# Patient Record
Sex: MALE | Race: WHITE | Employment: FULL TIME | URBAN - METROPOLITAN AREA
[De-identification: names, ages, dates, MRNs, and addresses within clinical notes are randomized per-mention and may not be internally consistent; named-entity substitution may affect disease eponyms.]

---

## 2018-01-29 ENCOUNTER — OFFICE VISIT (OUTPATIENT)
Dept: URGENT CARE | Facility: URGENT CARE | Age: 33
End: 2018-01-29
Payer: COMMERCIAL

## 2018-01-29 ENCOUNTER — RADIANT APPOINTMENT (OUTPATIENT)
Dept: GENERAL RADIOLOGY | Facility: CLINIC | Age: 33
End: 2018-01-29
Attending: NURSE PRACTITIONER
Payer: COMMERCIAL

## 2018-01-29 VITALS
WEIGHT: 148 LBS | HEIGHT: 67 IN | DIASTOLIC BLOOD PRESSURE: 94 MMHG | BODY MASS INDEX: 23.23 KG/M2 | SYSTOLIC BLOOD PRESSURE: 160 MMHG | TEMPERATURE: 98.4 F | HEART RATE: 80 BPM

## 2018-01-29 DIAGNOSIS — S90.222A: ICD-10-CM

## 2018-01-29 DIAGNOSIS — S96.912A: Primary | ICD-10-CM

## 2018-01-29 DIAGNOSIS — S99.922A INJURY OF LEFT FOOT, INITIAL ENCOUNTER: ICD-10-CM

## 2018-01-29 PROCEDURE — 99213 OFFICE O/P EST LOW 20 MIN: CPT | Performed by: NURSE PRACTITIONER

## 2018-01-29 PROCEDURE — 73660 X-RAY EXAM OF TOE(S): CPT | Mod: LT

## 2018-01-29 RX ORDER — HYDROCODONE BITARTRATE AND ACETAMINOPHEN 5; 325 MG/1; MG/1
1 TABLET ORAL EVERY 6 HOURS PRN
Qty: 20 TABLET | Refills: 0 | Status: SHIPPED | OUTPATIENT
Start: 2018-01-29 | End: 2020-07-07

## 2018-01-29 NOTE — MR AVS SNAPSHOT
After Visit Summary   1/29/2018    Parish Rodríguez    MRN: [id number]           Patient Information     Date Of Birth          1985        Visit Information        Provider Department      1/29/2018 5:20 PM Jenna Mccoy APRN CNP [facility]        Today's Diagnoses     Injury of left foot, initial encounter    -  1    Strain of toe of left foot, initial encounter        Subungual contusion of toenail of left foot, initial encounter          Care Instructions    Your provider has referred you to: [facility]: [facility] [phone]       Soft Tissue Contusion  You have a contusion. This is also called a bruise. There is swelling and some bleeding under the skin. This injury generally takes a few days to a few weeks to heal.  During that time, the bruise will typically change in color from reddish, to purple-blue, to greenish-yellow, then to yellow-brown.  Home care    Elevate the injured area to reduce pain and swelling. As much as possible, sit or lie down with the injured area raised about the level of your heart. This is especially important during the first 48 hours.    Ice the injured area to help reduce pain and swelling. Wrap a cold source (ice pack or ice cubes in a plastic bag) in a thin towel. Apply to the bruised area for 20 minutes every 1 to 2 hours the first day. Continue this 3 to 4 times a day until the pain and swelling goes away.    Unless another medicine was prescribed, you can take acetaminophen, ibuprofen, or naproxen to control pain. (If you have chronic liver or kidney disease or ever had a stomach ulcer or gastrointestinal bleeding, talk with your doctor before using these medicines.)  Follow-up care  Follow up with your healthcare provider or our staff as advised. Call if you are not better in 1 to 2 weeks.  When to seek medical advice   Call your healthcare provider right away if you have any of the  following:    Increased pain or swelling    Bruise is on an arm or leg and arm or leg becomes cold, blue, numb or tingly    Signs of infection: Warmth, drainage, or increased redness or pain around the contusion    Inability to move the injured area or body part     Bruise is near your eye and you have problems with your eyesight or eye     Frequent bruising for unknown reasons  Date Last Reviewed: 5/1/2017 2000-2017 [employer]. [address] [place] [address]. All rights reserved. This information is not intended as a substitute for professional medical care. Always follow your healthcare professional's instructions.        Subungual Hematoma  A subungual hematoma is blood under the nail. It can occur when your finger or toe is hit or crushed. It causes the nail to look blue. In some cases, you may fracture the bone under the nail.   If the bruise is small and not too painful, it will heal without treatment. If the bruise is large and painful, you may need to have the blood drained.  If a large area of the nail is damaged, your doctor may want to remove it. If he or she does not remove the nail, it may become loose or fall off in the next 2 weeks. In almost all cases, the nail will grow back from the area under the cuticle called the matrix. This takes a few weeks to start and is complete in about 4 to 6 months for a fingernail and 12 months for a toenail. If the nail bed or matrix was damaged, the nail may grow back with a rough or irregular shape. Sometimes the nail may not regrow at all.  Home care  The following guidelines will help you care for your wound at home:    Apply an ice pack (ice cubes in a plastic bag, wrapped in a thin towel) for no more than 20 minutes every 3 to 6 hours for the first 1 to 2 days. Continue this, as needed, 3 to 4 times a day until the pain and swelling goes away.    If the nail was drained:    Keep the nail covered with a clean adhesive bandage for the  next 2 days. There may be some oozing of blood during that time, so change the bandage as needed.    Rinse the finger or toe once a day under warm running water. Clean any crust away with a cotton-tipped applicator soaked in soapy water.    If the nail was removed:    The nail bed (tissue under the nail) is moist, soft and sensitive. This needs to be protected from injury for the first 7 to 10 days until it dries out and becomes hard. Keep it covered with a dressing or adhesive bandage until that time.    Bandages tend to stick to a newly exposed nail bed and can be hard to remove if left in place more than 24 hours. Therefore, unless you were told otherwise, change dressings every 24 hours. Apply petroleum jelly and then a non-stick dressing. This will keep the bandage from sticking and make it easier to remove.  If necessary, soak the dressing off while holding your finger or toe under warm running water.    If an X-ray showed a fracture, protect the finger or toe for 3 to 4 weeks while it is healing.  Here is some information regarding medicine and your wound:    You can take over-the-counter pain medicine such as acetaminophen for pain, unless you were given a different pain medicine to use. Talk with your healthcare provider before using this medicine if you have chronic liver or kidney disease. Also talk with your provider if you have had a stomach ulcer or digestive tract bleeding, or you are taking blood-thinner medicine.    If you were given antibiotics, take them until they are used up. It is important to finish the antibiotics even if the wound looks better. This will ensure the infection has cleared.  Follow-up care  Follow up with your doctor, or as advised. If the nail was drained, there is a small risk of infection. Watch carefully for the signs listed below.  When to seek medical advice  Call your doctor right away if any of these occur:    Increasing redness around the nail    Increasing local pain  or swelling    Pus draining from the nail    Fever of 100.4 F (38 C) or higher, or as directed by your healthcare provider  Date Last Reviewed: 9/1/2016 2000-2017 [employer]. [address] [place] [address], [place] [address]. All rights reserved. This information is not intended as a substitute for professional medical care. Always follow your healthcare professional's instructions.        Toe Sprain  A sprain is a stretching or tearing of the ligaments that hold a joint together. There are no broken bones. Sprains generally take from 3-6 weeks to heal. A toe sprain may be treated by taping the injured toe to the next toe. This is called buddy taping. This protects the injured toe and holds it in position. Mild sprains may not need any additional support. If the toenail has been hurt badly, it may fall off in 1-2 weeks. A new one will usually start to grow back within a month.     Home care    Keep your leg elevated when sitting or lying down. This is very important during the first 48 hours to reduce swelling. Stay off the injured foot as much as possible until you can walk on it without pain. If needed, you may use crutches during the first week for this purpose. Crutches can be rented at many pharmacies or surgical/orthopedic supply stores.    You may be given a cast shoe to wear to prevent movement in your toe. If not, you can use a sandal or any shoe that does not put pressure on the injured toe until the swelling and pain go away. If using a sandal, be careful not to hit your foot against anything, since another injury could make the sprain worse.    Apply an ice pack over the injured area for 15 to 20 minutes every 3 to 6 hours. You should do this for the first 24 to 48 hours. You can make an ice pack by filling a plastic bag that seals at the top with ice cubes and then wrapping it with a thin towel. Continue to use ice packs for relief of pain and swelling as needed. As the ice melts, be careful  to avoid getting your wrap, splint, or cast wet. As the ice melts, be careful to avoid getting any wrap, splint, tape, or cast wet. After 48 hours, apply heat from a warm shower or bath for 20 minutes several times daily. Alternating ice and heat may also be helpful.    If haseeb tape was applied and it becomes wet or dirty, change it. You may replace it with paper, plastic or cloth tape. Cloth tape and paper tapes must be kept dry. Apply gauze or cotton padding between the toes, especially near webbed area. This will help prevent the skin from getting moist and breaking down. Keep the haseeb tape in place for at least 4 weeks, or as advised by your healthcare provider.    You may use over-the-counter pain medicine to control pain, unless another pain medicine was prescribed. If you have chronic liver or kidney disease or ever had a stomach ulcer or GI bleeding, talk with your healthcare provider before using these medicines.    You may return to sports after healing, when you can run without pain.  Follow-up care  Follow up with your with your healthcare provider as advised. Sometimes fractures don t show up on the first X-ray. Bruises and sprains can sometimes hurt as much as a fracture. These injuries can take time to heal completely. If your symptoms don t improve or they get worse, talk with your healthcare provider. You may need a repeat X-ray. If X-rays were taken, you will be told of any new findings that may affect your care.  When to seek medical advice  Call your healthcare provider right away if any of these occur:    Redness, warmth, or fluid drainage from your toe    Pain or swelling increases    Toes become cold, blue, numb, or tingly  Date Last Reviewed: 11/20/2015 2000-2017 [employer]. [address] [place] [address], [place] [address]. All rights reserved. This information is not intended as a substitute for professional medical care. Always follow your healthcare professional's  "instructions.                Follow-ups after your visit        Who to contact     If you have questions or need follow up information about today's clinic visit or your schedule please contact [facility] directly at [phone].  Normal or non-critical lab and imaging results will be communicated to you by MyChart, letter or phone within 4 business days after the clinic has received the results. If you do not hear from us within 7 days, please contact the clinic through [employer] or phone. If you have a critical or abnormal lab result, we will notify you by phone as soon as possible.  Submit refill requests through txt[employer] or call your pharmacy and they will forward the refill request to us. Please allow 3 business days for your refill to be completed.          Additional Information About Your Visit        [employer] Information     txtr lets you send messages to your doctor, view your test results, renew your prescriptions, schedule appointments and more. To sign up, go to [email or web address] . Click on \"Log in\" on the left side of the screen, which will take you to the Welcome page. Then click on \"Sign up Now\" on the right side of the page.     You will be asked to enter the access code listed below, as well as some personal information. Please follow the directions to create your username and password.     Your access code is: [email or web address]  Expires: 2018  6:06 PM     Your access code will  in 90 days. If you need help or a new code, please call your [facility] clinic or [phone].        Care EveryWhere ID     This is your Care EveryWhere ID. This could be used by other organizations to access your [facility] medical records  [id number]        Your Vitals Were     Pulse Temperature Height BMI (Body Mass Index)          80 98.4  F (36.9  C) (Tympanic) 5' 7\" (1.702 m) 23.18 kg/m2         Blood Pressure from Last 3 Encounters:   18 (!) 160/94   16 124/81 "   01/29/08 118/70    Weight from Last 3 Encounters:   01/29/18 148 lb (67.1 kg)   01/28/16 129 lb (58.5 kg)   01/29/08 133 lb 12.8 oz (60.7 kg)                 Today's Medication Changes          These changes are accurate as of 1/29/18  6:06 PM.  If you have any questions, ask your nurse or doctor.               Start taking these medicines.        Dose/Directions    HYDROcodone-acetaminophen 5-325 MG per tablet   Commonly known as:  NORCO   Used for:  Strain of toe of left foot, initial encounter   Started by:  Jenna Mccoy APRN CNP        Dose:  1 tablet   Take 1 tablet by mouth every 6 hours as needed for pain maximum 4 tablet(s) per day   Quantity:  20 tablet   Refills:  0            Where to get your medicines      Some of these will need a paper prescription and others can be bought over the counter.  Ask your nurse if you have questions.     Bring a paper prescription for each of these medications     HYDROcodone-acetaminophen 5-325 MG per tablet                Primary Care Provider Office Phone # Fax #    Bimal Roldan [phone]       [address] [place] [phone]        Equal Access to Services     [facility] AH: Hadii tiffanie simeon hadasho Sojudith, waaxda luqadaha, qaybta kaalmada adeegyada, johny grimm . So [facility] [phone].    ATENCIÓN: Si habla español, tiene a parham disposición servicios gratuitos de asistencia lingüística. Llame al [phone].    We comply with applicable federal civil rights laws and [place] laws. We do not discriminate on the basis of race, color, national origin, age, disability, sex, sexual orientation, or gender identity.            Thank you!     Thank you for choosing [facility]  for your care. Our goal is always to provide you with excellent care. Hearing back from our patients is one way we can continue to improve our services. Please take a few minutes to complete the written  survey that you may receive in the mail after your visit with us. Thank you!             Your Updated Medication List - Protect others around you: Learn how to safely use, store and throw away your medicines at www.disposemymeds.org.          This list is accurate as of 1/29/18  6:06 PM.  Always use your most recent med list.                   Brand Name Dispense Instructions for use Diagnosis    HYDROcodone-acetaminophen 5-325 MG per tablet    NORCO    20 tablet    Take 1 tablet by mouth every 6 hours as needed for pain maximum 4 tablet(s) per day    Strain of toe of left foot, initial encounter

## 2018-01-29 NOTE — PROGRESS NOTES
"SUBJECTIVE:  Parish Rodríguez is a 32 year old male who sustained a left foot injury 3 days ago.   Mechanism of injury: dropped plate on his toe.   Immediate symptoms: immediate swelling.   Symptoms have been gradual since that time.   Prior history of related problems: no prior problems with this area in the past.    Past Medical History:   Diagnosis Date     Calculus of kidney 12/04     Headache(784.0)     occas with stress      Past Surgical History:   Procedure Laterality Date     SURGICAL HISTORY OF -   1995    TONGUE TIED     SURGICAL HISTORY OF -   2/02    Lt hand 5th metacarpal fx - [facility] - Dr Velásquez      Social History   Substance Use Topics     Smoking status: Current Every Day Smoker     Packs/day: 0.50     Years: 5.00     Types: Cigarettes     Last attempt to quit: 8/1/2005     Smokeless tobacco: Never Used     Alcohol use 10.0 oz/week       Constitutional, HEENT, cardiovascular, pulmonary, gi and gu systems are negative, except as otherwise noted.    OBJECTIVE:  Blood pressure (!) 160/94, pulse 80, temperature 98.4  F (36.9  C), temperature source Tympanic, height 5' 7\" (1.702 m), weight 148 lb (67.1 kg).  Appearance: in no apparent distress and well developed and well nourished.  Constitutional: healthy, alert and no distress   Cardiovascular: negative, PMI normal. No lifts, heaves, or thrills. RRR. No murmurs, clicks gallops or rub  Respiratory: negative, Percussion normal. Good diaphragmatic excursion. Lungs clear  NEURO: Gait normal. Reflexes normal and symmetric. Sensation grossly WNL.    Foot/ankle exam: ecchymoses and swelling of the greater toe, peripheral pulses normal, remainder of foot and ankle exam is normal, ipsilateral knee exam is normal, contralateral foot exam is normal.    X-ray:   XR TOE LT G/E 2 VW 1/29/2018 6:02 PM     COMPARISON: None.     HISTORY: Left foot injury.         IMPRESSION: No fractures are seen in the left great toe. Joint spaces  are preserved and in normal " alignment.     ASSESSMENT:    ICD-10-CM    1. Strain of toe of left foot, initial encounter S96.912A HYDROcodone-acetaminophen (NORCO) 5-325 MG per tablet   2. Subungual contusion of toenail of left foot, initial encounter S90.222A PODIATRY/FOOT & ANKLE SURGERY REFERRAL   3. Injury of left foot, initial encounter S99.922A XR Toe Left G/E 2 Views         PLAN:  Patient Instructions   Your provider has referred you to: [facility]: [facility] [phone]       Soft Tissue Contusion  You have a contusion. This is also called a bruise. There is swelling and some bleeding under the skin. This injury generally takes a few days to a few weeks to heal.  During that time, the bruise will typically change in color from reddish, to purple-blue, to greenish-yellow, then to yellow-brown.  Home care    Elevate the injured area to reduce pain and swelling. As much as possible, sit or lie down with the injured area raised about the level of your heart. This is especially important during the first 48 hours.    Ice the injured area to help reduce pain and swelling. Wrap a cold source (ice pack or ice cubes in a plastic bag) in a thin towel. Apply to the bruised area for 20 minutes every 1 to 2 hours the first day. Continue this 3 to 4 times a day until the pain and swelling goes away.    Unless another medicine was prescribed, you can take acetaminophen, ibuprofen, or naproxen to control pain. (If you have chronic liver or kidney disease or ever had a stomach ulcer or gastrointestinal bleeding, talk with your doctor before using these medicines.)  Follow-up care  Follow up with your healthcare provider or our staff as advised. Call if you are not better in 1 to 2 weeks.  When to seek medical advice   Call your healthcare provider right away if you have any of the following:    Increased pain or swelling    Bruise is on an arm or leg and arm or leg becomes cold, blue, numb or tingly    Signs of  infection: Warmth, drainage, or increased redness or pain around the contusion    Inability to move the injured area or body part     Bruise is near your eye and you have problems with your eyesight or eye     Frequent bruising for unknown reasons  Date Last Reviewed: 5/1/2017 2000-2017 [employer]. [address] [place], [place] [id number]. All rights reserved. This information is not intended as a substitute for professional medical care. Always follow your healthcare professional's instructions.        Subungual Hematoma  A subungual hematoma is blood under the nail. It can occur when your finger or toe is hit or crushed. It causes the nail to look blue. In some cases, you may fracture the bone under the nail.   If the bruise is small and not too painful, it will heal without treatment. If the bruise is large and painful, you may need to have the blood drained.  If a large area of the nail is damaged, your doctor may want to remove it. If he or she does not remove the nail, it may become loose or fall off in the next 2 weeks. In almost all cases, the nail will grow back from the area under the cuticle called the matrix. This takes a few weeks to start and is complete in about 4 to 6 months for a fingernail and 12 months for a toenail. If the nail bed or matrix was damaged, the nail may grow back with a rough or irregular shape. Sometimes the nail may not regrow at all.  Home care  The following guidelines will help you care for your wound at home:    Apply an ice pack (ice cubes in a plastic bag, wrapped in a thin towel) for no more than 20 minutes every 3 to 6 hours for the first 1 to 2 days. Continue this, as needed, 3 to 4 times a day until the pain and swelling goes away.    If the nail was drained:    Keep the nail covered with a clean adhesive bandage for the next 2 days. There may be some oozing of blood during that time, so change the bandage as needed.    Rinse the finger or toe once a  day under warm running water. Clean any crust away with a cotton-tipped applicator soaked in soapy water.    If the nail was removed:    The nail bed (tissue under the nail) is moist, soft and sensitive. This needs to be protected from injury for the first 7 to 10 days until it dries out and becomes hard. Keep it covered with a dressing or adhesive bandage until that time.    Bandages tend to stick to a newly exposed nail bed and can be hard to remove if left in place more than 24 hours. Therefore, unless you were told otherwise, change dressings every 24 hours. Apply petroleum jelly and then a non-stick dressing. This will keep the bandage from sticking and make it easier to remove.  If necessary, soak the dressing off while holding your finger or toe under warm running water.    If an X-ray showed a fracture, protect the finger or toe for 3 to 4 weeks while it is healing.  Here is some information regarding medicine and your wound:    You can take over-the-counter pain medicine such as acetaminophen for pain, unless you were given a different pain medicine to use. Talk with your healthcare provider before using this medicine if you have chronic liver or kidney disease. Also talk with your provider if you have had a stomach ulcer or digestive tract bleeding, or you are taking blood-thinner medicine.    If you were given antibiotics, take them until they are used up. It is important to finish the antibiotics even if the wound looks better. This will ensure the infection has cleared.  Follow-up care  Follow up with your doctor, or as advised. If the nail was drained, there is a small risk of infection. Watch carefully for the signs listed below.  When to seek medical advice  Call your doctor right away if any of these occur:    Increasing redness around the nail    Increasing local pain or swelling    Pus draining from the nail    Fever of 100.4 F (38 C) or higher, or as directed by your healthcare provider  Date  Last Reviewed: 9/1/2016 2000-2017 [employer]. [address] [place] [address], [place] [address]. All rights reserved. This information is not intended as a substitute for professional medical care. Always follow your healthcare professional's instructions.        Toe Sprain  A sprain is a stretching or tearing of the ligaments that hold a joint together. There are no broken bones. Sprains generally take from 3-6 weeks to heal. A toe sprain may be treated by taping the injured toe to the next toe. This is called buddy taping. This protects the injured toe and holds it in position. Mild sprains may not need any additional support. If the toenail has been hurt badly, it may fall off in 1-2 weeks. A new one will usually start to grow back within a month.     Home care    Keep your leg elevated when sitting or lying down. This is very important during the first 48 hours to reduce swelling. Stay off the injured foot as much as possible until you can walk on it without pain. If needed, you may use crutches during the first week for this purpose. Crutches can be rented at many pharmacies or surgical/orthopedic supply stores.    You may be given a cast shoe to wear to prevent movement in your toe. If not, you can use a sandal or any shoe that does not put pressure on the injured toe until the swelling and pain go away. If using a sandal, be careful not to hit your foot against anything, since another injury could make the sprain worse.    Apply an ice pack over the injured area for 15 to 20 minutes every 3 to 6 hours. You should do this for the first 24 to 48 hours. You can make an ice pack by filling a plastic bag that seals at the top with ice cubes and then wrapping it with a thin towel. Continue to use ice packs for relief of pain and swelling as needed. As the ice melts, be careful to avoid getting your wrap, splint, or cast wet. As the ice melts, be careful to avoid getting any wrap, splint, tape, or cast  wet. After 48 hours, apply heat from a warm shower or bath for 20 minutes several times daily. Alternating ice and heat may also be helpful.    If haseeb tape was applied and it becomes wet or dirty, change it. You may replace it with paper, plastic or cloth tape. Cloth tape and paper tapes must be kept dry. Apply gauze or cotton padding between the toes, especially near webbed area. This will help prevent the skin from getting moist and breaking down. Keep the haseeb tape in place for at least 4 weeks, or as advised by your healthcare provider.    You may use over-the-counter pain medicine to control pain, unless another pain medicine was prescribed. If you have chronic liver or kidney disease or ever had a stomach ulcer or GI bleeding, talk with your healthcare provider before using these medicines.    You may return to sports after healing, when you can run without pain.  Follow-up care  Follow up with your with your healthcare provider as advised. Sometimes fractures don t show up on the first X-ray. Bruises and sprains can sometimes hurt as much as a fracture. These injuries can take time to heal completely. If your symptoms don t improve or they get worse, talk with your healthcare provider. You may need a repeat X-ray. If X-rays were taken, you will be told of any new findings that may affect your care.  When to seek medical advice  Call your healthcare provider right away if any of these occur:    Redness, warmth, or fluid drainage from your toe    Pain or swelling increases    Toes become cold, blue, numb, or tingly  Date Last Reviewed: 11/20/2015 2000-2017 [employer]. [address] [place] [address], [place] [address]. All rights reserved. This information is not intended as a substitute for professional medical care. Always follow your healthcare professional's instructions.              XAVIER Gonzalez CNP

## 2018-01-30 NOTE — PATIENT INSTRUCTIONS
Your provider has referred you to: [facility]: [facility] [phone]       Soft Tissue Contusion  You have a contusion. This is also called a bruise. There is swelling and some bleeding under the skin. This injury generally takes a few days to a few weeks to heal.  During that time, the bruise will typically change in color from reddish, to purple-blue, to greenish-yellow, then to yellow-brown.  Home care    Elevate the injured area to reduce pain and swelling. As much as possible, sit or lie down with the injured area raised about the level of your heart. This is especially important during the first 48 hours.    Ice the injured area to help reduce pain and swelling. Wrap a cold source (ice pack or ice cubes in a plastic bag) in a thin towel. Apply to the bruised area for 20 minutes every 1 to 2 hours the first day. Continue this 3 to 4 times a day until the pain and swelling goes away.    Unless another medicine was prescribed, you can take acetaminophen, ibuprofen, or naproxen to control pain. (If you have chronic liver or kidney disease or ever had a stomach ulcer or gastrointestinal bleeding, talk with your doctor before using these medicines.)  Follow-up care  Follow up with your healthcare provider or our staff as advised. Call if you are not better in 1 to 2 weeks.  When to seek medical advice   Call your healthcare provider right away if you have any of the following:    Increased pain or swelling    Bruise is on an arm or leg and arm or leg becomes cold, blue, numb or tingly    Signs of infection: Warmth, drainage, or increased redness or pain around the contusion    Inability to move the injured area or body part     Bruise is near your eye and you have problems with your eyesight or eye     Frequent bruising for unknown reasons  Date Last Reviewed: 5/1/2017 2000-2017 [employer]. [address] [place] [address], [place] [address]. All rights reserved. This  information is not intended as a substitute for professional medical care. Always follow your healthcare professional's instructions.        Subungual Hematoma  A subungual hematoma is blood under the nail. It can occur when your finger or toe is hit or crushed. It causes the nail to look blue. In some cases, you may fracture the bone under the nail.   If the bruise is small and not too painful, it will heal without treatment. If the bruise is large and painful, you may need to have the blood drained.  If a large area of the nail is damaged, your doctor may want to remove it. If he or she does not remove the nail, it may become loose or fall off in the next 2 weeks. In almost all cases, the nail will grow back from the area under the cuticle called the matrix. This takes a few weeks to start and is complete in about 4 to 6 months for a fingernail and 12 months for a toenail. If the nail bed or matrix was damaged, the nail may grow back with a rough or irregular shape. Sometimes the nail may not regrow at all.  Home care  The following guidelines will help you care for your wound at home:    Apply an ice pack (ice cubes in a plastic bag, wrapped in a thin towel) for no more than 20 minutes every 3 to 6 hours for the first 1 to 2 days. Continue this, as needed, 3 to 4 times a day until the pain and swelling goes away.    If the nail was drained:    Keep the nail covered with a clean adhesive bandage for the next 2 days. There may be some oozing of blood during that time, so change the bandage as needed.    Rinse the finger or toe once a day under warm running water. Clean any crust away with a cotton-tipped applicator soaked in soapy water.    If the nail was removed:    The nail bed (tissue under the nail) is moist, soft and sensitive. This needs to be protected from injury for the first 7 to 10 days until it dries out and becomes hard. Keep it covered with a dressing or adhesive bandage until that time.    Bandages  tend to stick to a newly exposed nail bed and can be hard to remove if left in place more than 24 hours. Therefore, unless you were told otherwise, change dressings every 24 hours. Apply petroleum jelly and then a non-stick dressing. This will keep the bandage from sticking and make it easier to remove.  If necessary, soak the dressing off while holding your finger or toe under warm running water.    If an X-ray showed a fracture, protect the finger or toe for 3 to 4 weeks while it is healing.  Here is some information regarding medicine and your wound:    You can take over-the-counter pain medicine such as acetaminophen for pain, unless you were given a different pain medicine to use. Talk with your healthcare provider before using this medicine if you have chronic liver or kidney disease. Also talk with your provider if you have had a stomach ulcer or digestive tract bleeding, or you are taking blood-thinner medicine.    If you were given antibiotics, take them until they are used up. It is important to finish the antibiotics even if the wound looks better. This will ensure the infection has cleared.  Follow-up care  Follow up with your doctor, or as advised. If the nail was drained, there is a small risk of infection. Watch carefully for the signs listed below.  When to seek medical advice  Call your doctor right away if any of these occur:    Increasing redness around the nail    Increasing local pain or swelling    Pus draining from the nail    Fever of 100.4 F (38 C) or higher, or as directed by your healthcare provider  Date Last Reviewed: 9/1/2016 2000-2017 [employer]. [address] [place] [address], [place] [id number]. All rights reserved. This information is not intended as a substitute for professional medical care. Always follow your healthcare professional's instructions.        Toe Sprain  A sprain is a stretching or tearing of the ligaments that hold a joint together. There are no broken  bones. Sprains generally take from 3-6 weeks to heal. A toe sprain may be treated by taping the injured toe to the next toe. This is called buddy taping. This protects the injured toe and holds it in position. Mild sprains may not need any additional support. If the toenail has been hurt badly, it may fall off in 1-2 weeks. A new one will usually start to grow back within a month.     Home care    Keep your leg elevated when sitting or lying down. This is very important during the first 48 hours to reduce swelling. Stay off the injured foot as much as possible until you can walk on it without pain. If needed, you may use crutches during the first week for this purpose. Crutches can be rented at many pharmacies or surgical/orthopedic supply stores.    You may be given a cast shoe to wear to prevent movement in your toe. If not, you can use a sandal or any shoe that does not put pressure on the injured toe until the swelling and pain go away. If using a sandal, be careful not to hit your foot against anything, since another injury could make the sprain worse.    Apply an ice pack over the injured area for 15 to 20 minutes every 3 to 6 hours. You should do this for the first 24 to 48 hours. You can make an ice pack by filling a plastic bag that seals at the top with ice cubes and then wrapping it with a thin towel. Continue to use ice packs for relief of pain and swelling as needed. As the ice melts, be careful to avoid getting your wrap, splint, or cast wet. As the ice melts, be careful to avoid getting any wrap, splint, tape, or cast wet. After 48 hours, apply heat from a warm shower or bath for 20 minutes several times daily. Alternating ice and heat may also be helpful.    If buddy tape was applied and it becomes wet or dirty, change it. You may replace it with paper, plastic or cloth tape. Cloth tape and paper tapes must be kept dry. Apply gauze or cotton padding between the toes, especially near webbed area. This  will help prevent the skin from getting moist and breaking down. Keep the haseeb tape in place for at least 4 weeks, or as advised by your healthcare provider.    You may use over-the-counter pain medicine to control pain, unless another pain medicine was prescribed. If you have chronic liver or kidney disease or ever had a stomach ulcer or GI bleeding, talk with your healthcare provider before using these medicines.    You may return to sports after healing, when you can run without pain.  Follow-up care  Follow up with your with your healthcare provider as advised. Sometimes fractures don t show up on the first X-ray. Bruises and sprains can sometimes hurt as much as a fracture. These injuries can take time to heal completely. If your symptoms don t improve or they get worse, talk with your healthcare provider. You may need a repeat X-ray. If X-rays were taken, you will be told of any new findings that may affect your care.  When to seek medical advice  Call your healthcare provider right away if any of these occur:    Redness, warmth, or fluid drainage from your toe    Pain or swelling increases    Toes become cold, blue, numb, or tingly  Date Last Reviewed: 11/20/2015 2000-2017 [employer]. [address] [place] [address], [place] [address]. All rights reserved. This information is not intended as a substitute for professional medical care. Always follow your healthcare professional's instructions.

## 2019-05-09 ENCOUNTER — ALLIED HEALTH/NURSE VISIT (OUTPATIENT)
Dept: FAMILY MEDICINE | Facility: CLINIC | Age: 34
End: 2019-05-09
Payer: COMMERCIAL

## 2019-05-09 VITALS — RESPIRATION RATE: 16 BRPM | DIASTOLIC BLOOD PRESSURE: 82 MMHG | HEART RATE: 64 BPM | SYSTOLIC BLOOD PRESSURE: 134 MMHG

## 2019-05-09 DIAGNOSIS — I10 HTN (HYPERTENSION): Primary | ICD-10-CM

## 2019-05-09 PROCEDURE — 99207 ZZC NO CHARGE NURSE ONLY: CPT

## 2020-04-24 ENCOUNTER — VIRTUAL VISIT (OUTPATIENT)
Dept: FAMILY MEDICINE | Facility: OTHER | Age: 35
End: 2020-04-24

## 2020-04-24 NOTE — PROGRESS NOTES
"Date: 2020 17:42:43  Clinician: Solange Valverde  Clinician NPI: [id number]  Patient: Parish Rodríguez  Patient : 1985  Patient Address: [id number] [place], [place] [address]  Patient Phone: [phone]  Visit Protocol: URI  Patient Summary:  Parish is a 34 year old ( : 1985 ) male who initiated a Visit for COVID-19 (Coronavirus) evaluation and screening. When asked the question \"Please sign me up to receive news, health information and promotions from [employer].\", Parish responded \"No\".    Parish states his symptoms started today.   His symptoms consist of a sore throat and a cough.   Symptom details     Cough: Parish coughs a few times an hour and his cough is not more bothersome at night. Phlegm comes into his throat when he coughs. He does not believe his cough is caused by post-nasal drip. The color of the phlegm is green.     Sore throat: Parish reports having mild throat pain (1-3 on a 10 point pain scale), does not have exudate on his tonsils, and can swallow liquids. The lymph nodes in his neck are not enlarged. A rash has not appeared on the skin since the sore throat started.      Parish denies having facial pain or pressure, nasal congestion, malaise, ear pain, fever, myalgias, rhinitis, headache, wheezing, enlarged lymph nodes, chills, vomiting, nausea, teeth pain, ageusia, anosmia, and diarrhea. He also denies taking antibiotic medication for the symptoms and having recent facial or sinus surgery in the past 60 days. He is not experiencing dyspnea.   Precipitating events  Within the past week, Parish has not been exposed to someone with strep throat. He has not recently been exposed to someone with influenza. Parish has been in close contact with the following high risk individuals: children under the age of 5.   Pertinent COVID-19 (Coronavirus) information  Parish has not traveled internationally or to the areas where COVID-19 (Coronavirus) is widespread, " including cruise ship travel in the last 14 days before the start of his symptoms.   Parish does not work or volunteer as healthcare worker or a  and does not work or volunteer in a healthcare facility.   He does not live with a healthcare worker.   Parish has not had a close contact with a laboratory-confirmed COVID-19 patient within 14 days of symptom onset. He also has not had a close contact with a suspected COVID-19 patient within 14 days of symptom onset.   Pertinent medical history  Parish does not need a return to work/school note.   Weight: 145 lbs   Parish smokes or uses smokeless tobacco.   Additional information as reported by the patient (free text): yesterday I woke up with sinus pressure today I woke up with the feeling of a chest cold and this afternoon I coughed up some green phlegm and the sinus pressure is gone   Weight: 145 lbs    MEDICATIONS: No current medications, ALLERGIES: NKDA  Clinician Response:  Dear Parish,   Dear Parish  Your symptoms show that you may have coronavirus (COVID-19). This illness can cause fever, cough and trouble breathing. Many people get a mild case and get better on their own. Some people can get very sick.   Will I be tested for COVID-19?  Because the virus is spreading, we are no longer testing most patients. You may request testing if:   You are very ill. For example, you're on chemotherapy, dialysis or home hospice care. (Contact your specialty clinic or program.)   You live in a nursing home or other long-term care facility. (Talk to your nurse manager or medical director.)   You're a health care worker. (Contact your employee health office.)   How can I protect others?  Without a test, we can't know for sure that you have COVID-19. For safety, it's very important to follow these rules.  First, stay home and away from others (self-isolate) until:   You've had no fever---and no medicine that reduces fever---for 3 full days (72 hours). And...     Your other symptoms have gotten better. For example, your cough or breathing has improved. And...   At least 7 days have passed since your symptoms started.   During this time:   Don't go to work, school or anywhere else.    Stay away from others in your home. No hugging, kissing or shaking hands.   Don't let anyone visit.   Cover your mouth and nose with a mask, tissue or wash cloth to avoid spreading germs.   Wash your hands and face often. Use soap and water.   How can I take care of myself?   1.Take Tylenol (acetaminophen) for fever or pain. If you have liver or kidney problems, ask your family doctor if it's okay to take Tylenol.        Adults can take either:    650 mg (two 325 mg pills) every 4 to 6 hours, or...   1,000 mg (two 500 mg pills) every 8 hours as needed.    Note: Don't take more than 3,000 mg in one day.   For children, check the Tylenol bottle for the right dose. The dose is based on the child's age or weight.   2.If you have other health problems (like cancer, heart failure, an organ transplant or severe kidney disease): Call your specialty clinic if you don't feel better in the next 2 days.       3.Know when to call 911: If your breathing is so bad that it keeps you from doing normal activities, call 911 or go to the emergency room. Tell them that you've been staying home and may have COVID-19.   Where can I get more information?  To learn more about COVID-19 and how to care for yourself at home, please visit the CDC website at https://www.cdc.gov/coronavirus/2019-ncov/about/steps-when-sick.html.  For more about your care at [facility], please visit [email or web address]     Diagnosis: Cough  Diagnosis ICD: R05

## 2020-06-28 ENCOUNTER — HOSPITAL ENCOUNTER (EMERGENCY)
Facility: CLINIC | Age: 35
Discharge: HOME | End: 2020-06-28
Attending: FAMILY MEDICINE | Admitting: FAMILY MEDICINE
Payer: COMMERCIAL

## 2020-06-28 ENCOUNTER — OFFICE VISIT (OUTPATIENT)
Dept: URGENT CARE | Facility: URGENT CARE | Age: 35
End: 2020-06-28
Payer: COMMERCIAL

## 2020-06-28 VITALS
RESPIRATION RATE: 16 BRPM | SYSTOLIC BLOOD PRESSURE: 151 MMHG | OXYGEN SATURATION: 97 % | TEMPERATURE: 98.1 F | HEIGHT: 67 IN | BODY MASS INDEX: 21.03 KG/M2 | DIASTOLIC BLOOD PRESSURE: 96 MMHG | HEART RATE: 83 BPM | WEIGHT: 134 LBS

## 2020-06-28 VITALS
TEMPERATURE: 97.4 F | RESPIRATION RATE: 16 BRPM | BODY MASS INDEX: 21.06 KG/M2 | SYSTOLIC BLOOD PRESSURE: 150 MMHG | DIASTOLIC BLOOD PRESSURE: 82 MMHG | OXYGEN SATURATION: 98 % | HEIGHT: 67 IN | HEART RATE: 86 BPM | WEIGHT: 134.2 LBS

## 2020-06-28 DIAGNOSIS — T15.92XA EYE FOREIGN BODY, LEFT, INITIAL ENCOUNTER: Primary | ICD-10-CM

## 2020-06-28 DIAGNOSIS — T15.02XA ACUTE FOREIGN BODY OF LEFT CORNEA, INITIAL ENCOUNTER: ICD-10-CM

## 2020-06-28 PROCEDURE — 65222 REMOVE FOREIGN BODY FROM EYE: CPT | Mod: LT | Performed by: FAMILY MEDICINE

## 2020-06-28 PROCEDURE — 99283 EMERGENCY DEPT VISIT LOW MDM: CPT | Mod: 25 | Performed by: FAMILY MEDICINE

## 2020-06-28 PROCEDURE — 99215 OFFICE O/P EST HI 40 MIN: CPT | Performed by: NURSE PRACTITIONER

## 2020-06-28 RX ORDER — TETRACAINE HYDROCHLORIDE 5 MG/ML
1-2 SOLUTION OPHTHALMIC ONCE
Status: DISCONTINUED | OUTPATIENT
Start: 2020-06-28 | End: 2020-06-28 | Stop reason: HOSPADM

## 2020-06-28 RX ORDER — ERYTHROMYCIN 5 MG/G
0.5 OINTMENT OPHTHALMIC 3 TIMES DAILY
Qty: 1 G | Refills: 0 | Status: SHIPPED | OUTPATIENT
Start: 2020-06-28 | End: 2020-07-07

## 2020-06-28 ASSESSMENT — MIFFLIN-ST. JEOR
SCORE: 1497.36
SCORE: 1496.45

## 2020-06-28 ASSESSMENT — PAIN SCALES - GENERAL: PAINLEVEL: MILD PAIN (2)

## 2020-06-28 NOTE — DISCHARGE INSTRUCTIONS
Apply erythromycin ophthalmic ointment to the left eye 3 times daily for up to 3 days until symptoms resolve.  Be seen if not improving daily and resolved within 3 days or if new or worsening symptoms at any time.

## 2020-06-28 NOTE — ED AVS SNAPSHOT
[facility] Emergency Department  [address] [facility] [address]  Phone:  [phone]  Fax:  [phone]                                    Parish Rodríguez   MRN: [id number]    Department:  [facility] Emergency Department   Date of Visit:  6/28/2020           After Visit Summary Signature Page    I have received my discharge instructions, and my questions have been answered. I have discussed any challenges I see with this plan with the nurse or doctor.    ..........................................................................................................................................  Patient/Patient Representative Signature      ..........................................................................................................................................  Patient Representative Print Name and Relationship to Patient    ..................................................               ................................................  Date                                   Time    ..........................................................................................................................................  Reviewed by Signature/Title    ...................................................              ..............................................  Date                                               Time          22EPIC Rev 08/18

## 2020-06-28 NOTE — ED NOTES
Patient comes in with an irritated left eye. States he thinks something is in the eye, unsure of what it could be. Also unaware if it was at work or home. States the pain in very minimal. Denies blurred vision.

## 2020-06-28 NOTE — PROGRESS NOTES
Subjective     Parish Rodríguez is a 34 year old male who presents to clinic today for the following health issues:    HPI     Chief Complaint   Patient presents with     Eye Problem     2-3 days something in his left eye, red, crusty this morning.     He thinks it could have occurred Friday. 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 He was also putting down concrete at home yesterday. Vision normal. He states he can see a small dark speck under pupil.    Patient Active Problem List   Diagnosis     Calculus of kidney     Past Surgical History:   Procedure Laterality Date     SURGICAL HISTORY OF -       TONGUE TIED     SURGICAL HISTORY OF -       Lt hand 5th metacarpal fx - ORIF - Dr Velásquez       Social History     Tobacco Use     Smoking status: Former Smoker     Packs/day: 0.50     Years: 5.00     Pack years: 2.50     Types: Cigarettes     Last attempt to quit: 2005     Years since quittin.9     Smokeless tobacco: Never Used   Substance Use Topics     Alcohol use: Yes     Alcohol/week: 16.7 standard drinks     Family History   Problem Relation Age of Onset     Breast Cancer Maternal Grandmother         ?     Cancer - colorectal Maternal Aunt         ?         Current Outpatient Medications   Medication Sig Dispense Refill     HYDROcodone-acetaminophen (NORCO) 5-325 MG per tablet Take 1 tablet by mouth every 6 hours as needed for pain maximum 4 tablet(s) per day (Patient not taking: Reported on 2020) 20 tablet 0     No Known Allergies      Reviewed and updated as needed this visit by Provider  Tobacco  Allergies  Meds  Problems  Med Hx  Surg Hx  Fam Hx         Review of Systems   Constitutional, HEENT, cardiovascular, pulmonary, GI, , musculoskeletal, neuro, skin, endocrine and psych systems are negative, except as otherwise noted.      Objective    BP (!) 150/82 (BP Location: Right arm, Patient Position: Sitting, Cuff Size: Adult Regular)   " Pulse 86   Temp 97.4  F (36.3  C) (Tympanic)   Resp 16   Ht 1.702 m (5' 7\")   Wt 60.9 kg (134 lb 3.2 oz)   SpO2 98%   BMI 21.02 kg/m    Body mass index is 21.02 kg/m .  Physical Exam   GENERAL: healthy, alert and no distress  EYES: Eyes grossly normal to inspection on right with left being a little injected and I see a very small pin point light bluish speck at about 4 o'clock off edge of pupil in iris, no rust ring, eye anesthetized and stained with no corneal abrasion, rinsed and little speck still present, PERRL and conjunctivae and sclerae normal, vision normal in both eyes  HENT: normocephalic  NECK: no adenopathy, supple with full ROM  ABDOMEN: soft, nontender, no hepatosplenomegaly, no masses and bowel sounds normal  MS: no gross musculoskeletal defects noted, no edema  No rash    Diagnostic Test Results:  Labs reviewed in Epic  No results found for this or any previous visit (from the past 24 hour(s)).        Assessment & Plan  Sending to ER for removal of foreign body in left eye.  Problem List Items Addressed This Visit     None      Visit Diagnoses     Eye foreign body, left, initial encounter    -  Primary                 Patient Instructions   Go directly to the [place] ER. They are expecting you.    No follow-ups on file.    XAVIER Garcia [facility] URG[facility]        "

## 2020-06-28 NOTE — ED PROVIDER NOTES
"  History     Chief Complaint   Patient presents with     Foreign Body in Eye     Sent to ED from [facility] for removal of metal FB in eye     HPI  Parish Rodríguez is a 34 year old male who was sent from the [place] urgent care with foreign body in the left cornea.  He reports foreign body sensation beginning 2 nights ago.  He is unaware of what may have gotten in the eye.  He has no history of contact lens use or eye surgery.  No decrease in his vision.    Allergies:  No Known Allergies    Problem List:    Patient Active Problem List    Diagnosis Date Noted     Calculus of kidney 2004     Priority: Medium        Past Medical History:    Past Medical History:   Diagnosis Date     Calculus of kidney      Headache(784.0)        Past Surgical History:    Past Surgical History:   Procedure Laterality Date     SURGICAL HISTORY OF -       TONGUE TIED     SURGICAL HISTORY OF -       Lt hand 5th metacarpal fx - ORIF - Dr Velásquez       Family History:    Family History   Problem Relation Age of Onset     Breast Cancer Maternal Grandmother         ?     Cancer - colorectal Maternal Aunt         ?       Social History:  Marital Status:  Single [1]  Social History     Tobacco Use     Smoking status: Former Smoker     Packs/day: 0.50     Years: 5.00     Pack years: 2.50     Types: Cigarettes     Last attempt to quit: 2005     Years since quittin.9     Smokeless tobacco: Never Used   Substance Use Topics     Alcohol use: Yes     Alcohol/week: 16.7 standard drinks     Drug use: No     Comment: quit marijuana        Medications:    erythromycin (ROMYCIN) 5 MG/GM ophthalmic ointment  HYDROcodone-acetaminophen (NORCO) 5-325 MG per tablet          Review of Systems  Further problem focused system review negative.    Physical Exam   BP: (!) 151/96  Pulse: 83  Temp: 98.1  F (36.7  C)  Resp: 16  Height: 170.2 cm (5' 7\")  Weight: 60.8 kg (134 lb)  SpO2: 97 %      Physical Exam  Healthy 34-year-old " no distress.  HEENT: There is conjunctival injection on the left eye without ciliary blush.  Lids and lashes are intact.  Left upper lid was everted and wiped with a saline moistened cotton-tipped applicator.  No foreign body was seen.  Slit-lamp examination was performed.  Lids and lashes intact.  Conjunctiva injected without foreign body.  Cornea reveals a foreign body at the 5 o'clock position shelter between the center of the pupil and the limbus.  Cornea was anesthetized with tetracaine.  The foreign body was removed with a button tipped applicator.  There was no residual foreign material or rust ring.  There was a residual minor corneal abrasion.  Anterior chamber is clear.  ED Course        Procedures               Critical Care time:  none               No results found for this or any previous visit (from the past 24 hour(s)).    Medications   tetracaine (PONTOCAINE) 0.5 % ophthalmic solution 1-2 drop (has no administration in time range)       Assessments & Plan (with Medical Decision Making)     34-year-old presented with foreign body sensation in the left eye.  Exam showed a foreign body as above.  This was removed as above.  Apply antibiotic ointment 3 times daily for up to 3 days until symptoms resolve.  Be seen if not improving daily with resolution within 3 days or if new or worsening symptoms at any time.    I have reviewed the nursing notes.    I have reviewed the findings, diagnosis, plan and need for follow up with the patient.       New Prescriptions    ERYTHROMYCIN (ROMYCIN) 5 MG/GM OPHTHALMIC OINTMENT    Place 0.5 inches Into the left eye 3 times daily for 3 days       Final diagnoses:   Acute foreign body of left cornea, initial encounter       6/28/2020   [facility] EMERGENCY DEPARTMENT     Khurram Eduardo MD  06/28/20 [id number]

## 2020-07-07 ENCOUNTER — OFFICE VISIT (OUTPATIENT)
Dept: FAMILY MEDICINE | Facility: CLINIC | Age: 35
End: 2020-07-07
Payer: COMMERCIAL

## 2020-07-07 VITALS
SYSTOLIC BLOOD PRESSURE: 130 MMHG | BODY MASS INDEX: 21.03 KG/M2 | WEIGHT: 134 LBS | RESPIRATION RATE: 18 BRPM | HEART RATE: 64 BPM | DIASTOLIC BLOOD PRESSURE: 84 MMHG | HEIGHT: 67 IN | TEMPERATURE: 98.3 F

## 2020-07-07 DIAGNOSIS — S05.02XD ABRASION OF LEFT CORNEA, SUBSEQUENT ENCOUNTER: Primary | ICD-10-CM

## 2020-07-07 PROCEDURE — 99212 OFFICE O/P EST SF 10 MIN: CPT | Performed by: FAMILY MEDICINE

## 2020-07-07 ASSESSMENT — MIFFLIN-ST. JEOR: SCORE: 1496.45

## 2020-07-07 NOTE — PROGRESS NOTES
SUBJECTIVE   Parish Rodríguez is a 34 year old male who presents with     ED/UC Followup:    Facility:  [facility]   Date of visit: 2020  Reason for visit: Foreign body in left eye  Current Status: Feels much better. Doesn't feel like there is anything in the eye         PCP   Bimal Roldan [phone]    Health Maintenance        Health Maintenance Due   Topic Date Due     HIV SCREENING  1998     PREVENTIVE CARE VISIT  2006     LIPID  2018     PHQ-2  2020       HPI        Patient Active Problem List   Diagnosis     Calculus of kidney     No current outpatient medications on file.     No current facility-administered medications for this visit.        Patient Active Problem List   Diagnosis     Calculus of kidney     Past Surgical History:   Procedure Laterality Date     SURGICAL HISTORY OF -       TONGUE TIED     SURGICAL HISTORY OF -       Lt hand 5th metacarpal fx - ORIF - Dr Velásquez       Social History     Tobacco Use     Smoking status: Former Smoker     Packs/day: 0.50     Years: 5.00     Pack years: 2.50     Types: Cigarettes     Last attempt to quit: 2005     Years since quittin.9     Smokeless tobacco: Never Used   Substance Use Topics     Alcohol use: Yes     Alcohol/week: 16.7 standard drinks     Family History   Problem Relation Age of Onset     Breast Cancer Maternal Grandmother         ?     Cancer - colorectal Maternal Aunt         ?         No current outpatient medications on file.     No Known Allergies  No lab results found.   BP Readings from Last 3 Encounters:   20 130/84   20 (!) 151/96   20 (!) 150/82    Wt Readings from Last 3 Encounters:   20 60.8 kg (134 lb)   20 60.8 kg (134 lb)   20 60.9 kg (134 lb 3.2 oz)                    Reviewed and updated:  Tobacco  Allergies  Meds  Med Hx  Surg Hx  Fam Hx  Soc Hx     ROS:  Constitutional, HEENT, cardiovascular, pulmonary, gi and gu systems are  "negative, except as otherwise noted.    PHYSICAL EXAM   /84 (Cuff Size: Adult Regular)   Pulse 64   Temp 98.3  F (36.8  C) (Tympanic)   Resp 18   Ht 1.702 m (5' 7\")   Wt 60.8 kg (134 lb)   BMI 20.99 kg/m    Body mass index is 20.99 kg/m .  GENERAL: healthy, alert and no distress  EYES: Eyes grossly normal to inspection, PERRL and conjunctivae and sclerae normal, fluorescein test negative, no foreign body visualized, normal fundus exam  NECK: no adenopathy, no asymmetry, masses, or scars and thyroid normal to palpation  RESP: lungs clear to auscultation - no rales, rhonchi or wheezes  CV: regular rates and rhythm, normal S1 S2, no S3 or S4 and no murmur, click or rub  MS: no gross musculoskeletal defects noted, no edema  NEURO: Normal strength and tone, mentation intact and speech normal    Assessment & Plan     (S05.02XD) Abrasion of left cornea, subsequent encounter  (primary encounter diagnosis)  Comment: Physical examination unremarkable.  Reassurance provided.  Symptoms resolved completely.  Suggested to schedule routine physical.  All questions answered.         Henrik Liriano MD  [facility]          "

## 2020-07-07 NOTE — NURSING NOTE
"Chief Complaint   Patient presents with     ER F/U     /84 (Cuff Size: Adult Regular)   Pulse 64   Temp 98.3  F (36.8  C) (Tympanic)   Resp 18   Ht 1.702 m (5' 7\")   Wt 60.8 kg (134 lb)   BMI 20.99 kg/m   Estimated body mass index is 20.99 kg/m  as calculated from the following:    Height as of this encounter: 1.702 m (5' 7\").    Weight as of this encounter: 60.8 kg (134 lb).  Patient presents to the clinic using No DME      Health Maintenance that is potentially due pending provider review:    Health Maintenance Due   Topic Date Due     HIV SCREENING  09/22/1998     PREVENTIVE CARE VISIT  02/21/2006     LIPID  09/22/2018     PHQ-2  01/01/2020                "

## 2025-04-16 ENCOUNTER — NURSE TRIAGE (OUTPATIENT)
Dept: NURSING | Facility: CLINIC | Age: 40
End: 2025-04-16
Payer: COMMERCIAL

## 2025-04-16 NOTE — TELEPHONE ENCOUNTER
Patient is calling stating that he hit his right hand on a door and feels that the 5th metacarpal on his right hand is broken.  He can barely move his hand.  He states that he had a similar incident 25 years ago to his left hand and it was broken.  He is currently 5 minutes away from the [place] [facility] and did not want to be triaged when offered.  He just wanted to ask if he should go to Urgent Car versus ED but then stated he was 5 minutes away from the ED and was just going there.         Reason for Disposition   General information question, no triage required and triager able to answer question    Additional Information   Negative: RN needs further essential information from caller in order to complete triage   Negative: Requesting regular office appointment   Negative: [1] Caller requesting NON-URGENT health information AND [2] PCP's office is the best resource   Negative: Health Information question, no triage required and triager able to answer question    Protocols used: Information Only Call - No Triage-A-

## 2025-04-19 ENCOUNTER — APPOINTMENT (OUTPATIENT)
Dept: GENERAL RADIOLOGY | Facility: CLINIC | Age: 40
End: 2025-04-19
Attending: NURSE PRACTITIONER
Payer: COMMERCIAL

## 2025-04-19 ENCOUNTER — HOSPITAL ENCOUNTER (EMERGENCY)
Facility: CLINIC | Age: 40
Discharge: HOME | End: 2025-04-19
Attending: NURSE PRACTITIONER
Payer: COMMERCIAL

## 2025-04-19 VITALS
HEART RATE: 76 BPM | DIASTOLIC BLOOD PRESSURE: 86 MMHG | OXYGEN SATURATION: 100 % | RESPIRATION RATE: 18 BRPM | SYSTOLIC BLOOD PRESSURE: 128 MMHG | TEMPERATURE: 98 F

## 2025-04-19 DIAGNOSIS — S62.316A DISPLACED FRACTURE OF BASE OF FIFTH METACARPAL BONE, RIGHT HAND, INITIAL ENCOUNTER FOR CLOSED FRACTURE: Primary | ICD-10-CM

## 2025-04-19 PROCEDURE — 99203 OFFICE O/P NEW LOW 30 MIN: CPT | Mod: 57 | Performed by: NURSE PRACTITIONER

## 2025-04-19 PROCEDURE — G0463 HOSPITAL OUTPT CLINIC VISIT: HCPCS | Mod: 25 | Performed by: NURSE PRACTITIONER

## 2025-04-19 PROCEDURE — 26600 TREAT METACARPAL FRACTURE: CPT | Mod: 54 | Performed by: NURSE PRACTITIONER

## 2025-04-19 PROCEDURE — 26600 TREAT METACARPAL FRACTURE: CPT | Mod: RT | Performed by: NURSE PRACTITIONER

## 2025-04-19 PROCEDURE — 999N000065 XR FINGER RIGHT G/E 2 VIEWS: Mod: RT

## 2025-04-19 PROCEDURE — 73130 X-RAY EXAM OF HAND: CPT | Mod: RT

## 2025-04-19 RX ORDER — LORAZEPAM 0.5 MG/1
0.5 TABLET ORAL EVERY 6 HOURS PRN
COMMUNITY

## 2025-04-19 ASSESSMENT — COLUMBIA-SUICIDE SEVERITY RATING SCALE - C-SSRS
1. IN THE PAST MONTH, HAVE YOU WISHED YOU WERE DEAD OR WISHED YOU COULD GO TO SLEEP AND NOT WAKE UP?: NO
6. HAVE YOU EVER DONE ANYTHING, STARTED TO DO ANYTHING, OR PREPARED TO DO ANYTHING TO END YOUR LIFE?: NO
2. HAVE YOU ACTUALLY HAD ANY THOUGHTS OF KILLING YOURSELF IN THE PAST MONTH?: NO

## 2025-04-19 ASSESSMENT — ACTIVITIES OF DAILY LIVING (ADL): ADLS_ACUITY_SCORE: 41

## 2025-04-19 NOTE — ED TRIAGE NOTES
Pt reports right hand injury form a door   Incident happened 4/16/25  Pt denies taking blood thinning medication   pt states he took half of an ATIVAN before coming in to UC

## 2025-04-19 NOTE — Clinical Note
Parish Rodríguez was seen and treated in our emergency department on 4/19/2025.  He may return to work on 04/21/2025.  No use of arm or hand until seen and evaluated by orthopedics.     If you have any questions or concerns, please don't hesitate to call.      Laura Cohen, APRN CNP

## 2025-04-19 NOTE — DISCHARGE INSTRUCTIONS
Recommend leaving splint on not removing or changing the splint at all.  An orthopedic consult has been ordered you can contact [facility] to schedule this at 1 [phone].  Recommend return if there is change of sensation or temperature of your fingers.  Ibuprofen and Tylenol can be used for pain as needed.  Recommend that you are wearing an arm sling when you are up and about to prevent swelling from your arm hanging below your waist.

## 2025-04-21 NOTE — PROGRESS NOTES
"Chief Complaint:   Chief Complaint   Patient presents with    Right Hand - Pain     5th MC neck fracture. DOI 4/16/25, 1 wk s/p. Patient notes he got frustrated and he \"bopped\" a door and broke his hand. He was seen and placed in a orthoglass splint. Pain is over the 5th MC. He denies much pain. He loosened the splint up a little but has remained in splint.         INJURY: right hand 5th metacarpal neck fracture  DATE of INJURY: 4/16/2025      HPI: Parish Rodríguez is a 39 year old male , right -hand dominant, who presents for evaluation and management of a right hand injury. He injured his hand on 4/16/2025 while hitting a door out of frustration. Onset of pain, swelling.     Presented to the ED on 4/19/2025, xrays showed a boxer's fracture. He was splinted. He's loosened the splint but has remained in the splint.    It has been 7 days since the initial injury. Not much pain today.      He reports having mild pain/discomfort around the injury site. He denies associated numbness or tingling. He denies any other injuries to his upper extremity.   Symptoms: pain, swelling.  Location of symptoms: base of small finger/hand.  Pain severity: 3/10  Pain quality: aching  Frequency of symptoms: frequently    Previous treatment: splint    Past medical history:  has a past medical history of Calculus of kidney (12/04) and Headache(784.0).   Patient Active Problem List    Diagnosis Date Noted    Calculus of kidney 12/13/2004     Priority: Medium       Past surgical history:  has a past surgical history that includes surgical history of -  (1995) and surgical history of -  (2/02).     Medications:    Current Outpatient Medications   Medication Sig Dispense Refill    LORazepam (ATIVAN) 0.5 MG tablet Take 0.5 mg by mouth every 6 hours as needed for anxiety.          Allergies:   No Known Allergies     Family History: family history includes Breast Cancer in his maternal grandmother; Cancer - colorectal in his maternal aunt. " "    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 His smoking use included cigarettes. He started smoking about 24 years ago. He has a 2.5 pack-year smoking history. He has never used smokeless tobacco. He reports current alcohol use of about 16.7 standard drinks of alcohol per week. He reports that he does not use drugs.    Review of Systems:  ROS: 10 point ROS neg other than the symptoms noted above in the HPI and past medical history.    Physical Exam  GENERAL APPEARANCE: healthy, alert, no distress.   SKIN: no suspicious lesions or rashes  NEURO: Normal strength and tone, mentation intact and speech normal  PSYCH:  mentation appears normal and affect normal. Not anxious.  RESPIRATORY: No increased work of breathing.    Ht 1.702 m (5' 7\")   Wt 59.4 kg (131 lb)   BMI 20.52 kg/m       right HAND EXAM:    The splint was removed    Skin intact. Ecchymosis.   Degenerative changes of the IP joints.    There is mild swelling in the hand, small finger.  There is moderate tenderness in the 5th metacarpal phalangeal joint, 5th metacarpal, small finger.  There is mild ecchymosis.  There is no erythema of the surrounding skin.  There is no maceration of the skin.     No scissoring of fingers with fist attempt. No rotational abnormality of finger with fist.  Intact dip flexion against resistance with isolation of fdp, as well as intact fds with full pip and metacarpal phalangeal joint  flexion.   Viktor test shows intact central slip.  No bowstringing.  Intact extensors. No extensor lag.     Stable to radial and ulnar stressing. Stable to anterior and posterior translation of metacarpal phalangeal joint, pip and dip joints.  Intact sensation to light touch in median, radial, ulnar nerves of the hand  Intact sensation to the radial and ulnar digital nerves of the finger, as well as the finger tip.  Brisk capillary refill to all fingers.   Palpable radial pulse, 2+.    X-rays:  3 views right " hand, 3 views right small finger from 4/19/2025 were reviewed in clinic today. On my review, Fifth metacarpal neck fracture with apex dorsal and ulnar angulation. Joint spaces are preserved. .    Assessment: 40yo RHD male with acute right hand pain status post punching a door, mildy angulated 5th metacarpal neck (boxer's) fracture..    Plan:  Nonop fracture management  Splint immobilization versus buddy taping  Elevation  Over the counter pain control as needed.  Return to clinic 4 weeks for repeat xrays right hand.      Jose Miguel Valentin M.D., M.S.  Dept. of Orthopaedic Surgery  [facility]    Cast/splint application    Date/Time: 4/23/2025 9:57 AM    Performed by: Bonnie Garg ATC  Authorized by: Jose Miguel Valentin MD    Consent:     Consent obtained:  Verbal    Consent given by:  Patient  Pre-procedure details:     Sensation:  Normal  Procedure details:     Laterality:  Right    Location:  Hand    Hand:  R hand    Strapping: no      Splint type:  Ulnar gutter    Supplies:  Fiberglass  Post-procedure details:     Sensation:  Normal    Patient tolerance of procedure:  Tolerated well, no immediate complications    Patient provided with cast or splint care instructions: Yes    Comments:      Parish was placed in an ulnar gutter splint including the 4th/5th fingers.  He was placed in an intrinsic plus position.  Patient was given verbal splint care instructions, including keeping the splint dry and keeping it on full time.  Patient verbally acknowledged this understanding and was satisfied with the splint application.      Bonnie Garg ATC

## 2025-04-23 ENCOUNTER — OFFICE VISIT (OUTPATIENT)
Dept: ORTHOPEDICS | Facility: CLINIC | Age: 40
End: 2025-04-23
Attending: NURSE PRACTITIONER
Payer: COMMERCIAL

## 2025-04-23 VITALS — HEIGHT: 67 IN | BODY MASS INDEX: 20.56 KG/M2 | WEIGHT: 131 LBS

## 2025-04-23 DIAGNOSIS — S62.316A DISPLACED FRACTURE OF BASE OF FIFTH METACARPAL BONE, RIGHT HAND, INITIAL ENCOUNTER FOR CLOSED FRACTURE: Primary | ICD-10-CM

## 2025-04-23 PROCEDURE — 1125F AMNT PAIN NOTED PAIN PRSNT: CPT | Performed by: ORTHOPAEDIC SURGERY

## 2025-04-23 PROCEDURE — 26600 TREAT METACARPAL FRACTURE: CPT | Mod: F9 | Performed by: ORTHOPAEDIC SURGERY

## 2025-04-23 PROCEDURE — 99203 OFFICE O/P NEW LOW 30 MIN: CPT | Mod: 57 | Performed by: ORTHOPAEDIC SURGERY

## 2025-04-23 RX ORDER — IBUPROFEN 200 MG
200 TABLET ORAL EVERY 4 HOURS PRN
COMMUNITY

## 2025-04-23 ASSESSMENT — PAIN SCALES - GENERAL: PAINLEVEL_OUTOF10: MILD PAIN (3)

## 2025-04-23 NOTE — LETTER
April 23, 2025      Parish Rodríguez  [address] [address] [facility] [address]        To whom it may concern:     Parish Rodríguez is under my care for   1. Displaced fracture of base of fifth metacarpal bone, right hand, initial encounter for closed fracture        Date of injury: 4/16/25          Return to work date:      No return to work at this time. Mr. Rodríguez will return to clinic in 4 weeks for reassessment. Please contact my office with any questions. Thank you.         Electronically Signed (as below)  Dr. Jose Miguel Valentin M.D.

## 2025-04-23 NOTE — LETTER
"4/23/2025      Parish Rodríguez  [address] [address] [address]      Dear Colleague,    Thank you for referring your patient, Parish Rodríguez, to the [facility] [place]. Please see a copy of my visit note below.    Chief Complaint:   Chief Complaint   Patient presents with     Right Hand - Pain     5th MC neck fracture. DOI 4/16/25, 1 wk s/p. Patient notes he got frustrated and he \"bopped\" a door and broke his hand. He was seen and placed in a orthoglass splint. Pain is over the 5th MC. He denies much pain. He loosened the splint up a little but has remained in splint.         INJURY: right hand 5th metacarpal neck fracture  DATE of INJURY: 4/16/2025      HPI: Parish Rodríguez is a 39 year old male , right -hand dominant, who presents for evaluation and management of a right hand injury. He injured his hand on 4/16/2025 while hitting a door out of frustration. Onset of pain, swelling.     Presented to the ED on 4/19/2025, xrays showed a boxer's fracture. He was splinted. He's loosened the splint but has remained in the splint.    It has been 7 days since the initial injury. Not much pain today.      He reports having mild pain/discomfort around the injury site. He denies associated numbness or tingling. He denies any other injuries to his upper extremity.   Symptoms: pain, swelling.  Location of symptoms: base of small finger/hand.  Pain severity: 3/10  Pain quality: aching  Frequency of symptoms: frequently    Previous treatment: splint    Past medical history:  has a past medical history of Calculus of kidney (12/04) and Headache(784.0).   Patient Active Problem List    Diagnosis Date Noted     Calculus of kidney 12/13/2004     Priority: Medium       Past surgical history:  has a past surgical history that includes surgical history of -  (1995) and surgical history of -  (2/02).     Medications:    Current Outpatient Medications   Medication Sig Dispense Refill     " "LORazepam (ATIVAN) 0.5 MG tablet Take 0.5 mg by mouth every 6 hours as needed for anxiety.          Allergies:   No Known Allergies     Family History: family history includes Breast Cancer in his maternal grandmother; Cancer - colorectal in his maternal aunt.     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 His smoking use included cigarettes. He started smoking about 24 years ago. He has a 2.5 pack-year smoking history. He has never used smokeless tobacco. He reports current alcohol use of about 16.7 standard drinks of alcohol per week. He reports that he does not use drugs.    Review of Systems:  ROS: 10 point ROS neg other than the symptoms noted above in the HPI and past medical history.    Physical Exam  GENERAL APPEARANCE: healthy, alert, no distress.   SKIN: no suspicious lesions or rashes  NEURO: Normal strength and tone, mentation intact and speech normal  PSYCH:  mentation appears normal and affect normal. Not anxious.  RESPIRATORY: No increased work of breathing.    Ht 1.702 m (5' 7\")   Wt 59.4 kg (131 lb)   BMI 20.52 kg/m       right HAND EXAM:    The splint was removed    Skin intact. Ecchymosis.   Degenerative changes of the IP joints.    There is mild swelling in the hand, small finger.  There is moderate tenderness in the 5th metacarpal phalangeal joint, 5th metacarpal, small finger.  There is mild ecchymosis.  There is no erythema of the surrounding skin.  There is no maceration of the skin.     No scissoring of fingers with fist attempt. No rotational abnormality of finger with fist.  Intact dip flexion against resistance with isolation of fdp, as well as intact fds with full pip and metacarpal phalangeal joint  flexion.   Viktor test shows intact central slip.  No bowstringing.  Intact extensors. No extensor lag.     Stable to radial and ulnar stressing. Stable to anterior and posterior translation of metacarpal phalangeal joint, pip and dip joints.  Intact " sensation to light touch in median, radial, ulnar nerves of the hand  Intact sensation to the radial and ulnar digital nerves of the finger, as well as the finger tip.  Brisk capillary refill to all fingers.   Palpable radial pulse, 2+.    X-rays:  3 views right hand, 3 views right small finger from 4/19/2025 were reviewed in clinic today. On my review, Fifth metacarpal neck fracture with apex dorsal and ulnar angulation. Joint spaces are preserved. .    Assessment: 40yo RHD male with acute right hand pain status post punching a door, mildy angulated 5th metacarpal neck (boxer's) fracture..    Plan:  Nonop fracture management  Splint immobilization versus buddy taping  Elevation  Over the counter pain control as needed.  Return to clinic 4 weeks for repeat xrays right hand.      Jose Miguel Valentin M.D., M.S.  Dept. of Orthopaedic Surgery  [facility]    Cast/splint application    Date/Time: 4/23/2025 9:57 AM    Performed by: Bonnie Garg ATC  Authorized by: Jose Miguel Valentin MD    Consent:     Consent obtained:  Verbal    Consent given by:  Patient  Pre-procedure details:     Sensation:  Normal  Procedure details:     Laterality:  Right    Location:  Hand    Hand:  R hand    Strapping: no      Splint type:  Ulnar gutter    Supplies:  Fiberglass  Post-procedure details:     Sensation:  Normal    Patient tolerance of procedure:  Tolerated well, no immediate complications    Patient provided with cast or splint care instructions: Yes    Comments:      Parish was placed in an ulnar gutter splint including the 4th/5th fingers.  He was placed in an intrinsic plus position.  Patient was given verbal splint care instructions, including keeping the splint dry and keeping it on full time.  Patient verbally acknowledged this understanding and was satisfied with the splint application.      Bonnie Garg ATC             Again, thank you for allowing me to participate in the care of your patient.         Sincerely,        Jose Miguel Valentin MD    Electronically signed

## 2025-04-25 NOTE — ED PROVIDER NOTES
ED Provider Note  [facility]      History     Chief Complaint   Patient presents with    Hand Injury     HPI  Parish Rodríguez is a 39 year old male who presents with right hand injury from punching a solid door.  Reports that he said swelling, pain since the injury occurred 3 days ago.  Denies any loss of sensation in fingers or hand.  Has pain and difficulty moving his fingers into a fist.  Patient is a tobacco user, history of anxiety.  Had a boxer's fracture of his left hand in the past punching a wall.  Has tried ibuprofen without significant improvement.  Apply ice on and off for the last several days.            Allergies:  No Known Allergies    Problem List:    Patient Active Problem List    Diagnosis Date Noted    Calculus of kidney 2004     Priority: Medium        Past Medical History:    Past Medical History:   Diagnosis Date    Calculus of kidney     Headache(784.0)        Past Surgical History:    Past Surgical History:   Procedure Laterality Date    SURGICAL HISTORY OF -       TONGUE TIED    SURGICAL HISTORY OF -       Lt hand 5th metacarpal fx - [facility] - Dr Velásquez       Social History:  Marital Status:  Single [1]  Social History     Tobacco Use    Smoking status: Former     Current packs/day: 0.00     Average packs/day: 0.5 packs/day for 5.0 years (2.5 ttl pk-yrs)     Types: Cigarettes     Start date: 2000     Quit date: 2005     Years since quittin.7    Smokeless tobacco: Never   Substance Use Topics    Alcohol use: Yes     Alcohol/week: 16.7 standard drinks of alcohol    Drug use: No     Comment: quit marijuana        Medications:    LORazepam (ATIVAN) 0.5 MG tablet  ibuprofen (ADVIL/MOTRIN) 200 MG tablet          Review of Systems  A medically appropriate review of systems was performed with pertinent positives and negatives noted in the HPI, and all other systems negative.    Physical Exam   No data found.       Physical  Exam  Musculoskeletal:      Right hand: Swelling, tenderness and bony tenderness present. Decreased range of motion. Decreased strength. Normal sensation. Normal capillary refill. Normal pulse.      Left hand: Normal.        Hands:       Comments: Pain specifically in dorsal and posterior side of his right hand and areas marked over metacarpal bones.  Swelling present with decreased range of motion, decreased strength denies any sensation change in fingers.   General: alert and in no acute distress on arrival  Head: atraumatic, normocephalic  Lungs:  nonlabored, CTA bilateral throughout.  CV: Regular rate and rhythm, extremities warm and perfused  Skin: no rashes, no diaphoresis and skin color normal  Neuro: Patient awake, alert, speech is fluent, no focal deficits  Psychiatric: affect/mood normal, appropriate historian      ED Course                 [facility]    Splint Application    Date/Time: 4/25/2025 11:10 AM    Performed by: Laura Cohen APRN CNP  Authorized by: Laura Cohen APRN CNP    Risks, benefits and alternatives discussed.      PRE-PROCEDURE DETAILS     Sensation:  Normal    Skin color:  Pink    PROCEDURE DETAILS     Laterality:  Right    Location:  Hand    Hand:  R hand    Strapping: no      Splint type:  Ulnar gutter    Supplies:  Ortho-Glass, cotton padding, elastic bandage and sling    POST PROCEDURE DETAILS     Pain:  Improved    Sensation:  Normal    Skin color:  Pink      PROCEDURE    Patient Tolerance:  Patient tolerated the procedure well with no immediate complications                  pdated   Order    04/19/25 1112  XR Finger Right G/E 2 Views  Performed: 04/19/25 1100  Final         Impression: IMPRESSION: Interval reduction and splinting of fifth metacarpal neck fracture. There is overall improved alignment with residual apex dorsal and ulnar angulation. Splinting obscures fine osseous detail.      04/19/25 1041  XR Hand Right G/E 3 Views  Performed:  04/19/25 [facility]  Final         Impression: IMPRESSION: Fifth metacarpal neck fracture with apex dorsal and ulnar angulation. Joint spaces are preserved.        No results found for this or any previous visit (from the past 48 hours).    MEDICATIONS GIVEN IN THE EMERGENCY DEPARTMENT:  Medications - No data to display             Assessments & Plan (with Medical Decision Making)  39 year old male who presents to the Urgent Care for evaluation of right hand injury after punching a solid door 3 days ago.  Has been using ice, ibuprofen without improvement.  X-rays ordered of right hand fifth metacarpal displaced fracture present this was reduced and a Ortho-Glass ulnar splint applied bennett-rayed personally viewed images radiology interpretation reviewed with improved alignment.  Orthopedic consult was placed patient to be seen in the next 3 to 5 days.  Recommended use of ibuprofen as needed and Tylenol elevation patient was put in a sling to decrease dependent edema and swelling.  Discharged home in stable condition.      Patient verbalized agreement with and understanding of the rational for the diagnosis and treatment plan.  All questions were answered to best of my ability and the patient's satisfaction. The patient was advised to contact or return to their primary clinic or UC/ED with any questions that may arise after this visit.       I have reviewed the nursing notes.    I have reviewed the findings, diagnosis, plan and need for follow up with the patient.        NEW PRESCRIPTIONS STARTED AT TODAY'S ER VISIT  Discharge Medication List as of 4/19/2025 11:31 AM          Final diagnoses:   Displaced fracture of base of fifth metacarpal bone, right hand, initial encounter for closed fracture       4/19/2025   [facility] EMERGENCY DEPT    Disclaimer: This note consists of symbols derived from keyboarding, dictation, and/or voice recognition software. As a result, there may be errors in the script that have  gone undetected.  Please consider this when interpreting information found in the chart.      Laura Cohen, APRN CNP  04/25/25 1111

## 2025-05-13 NOTE — PROGRESS NOTES
Chief Complaint:   Chief Complaint   Patient presents with    Right Hand - Follow Up     Parish is following up today for Right 5th MC neck fracture.  He is doing really well overall and not having any pain.   No concerns at this moments.          INJURY: right hand 5th metacarpal neck fracture  DATE of INJURY: 4/16/2025      HPI: Parish Rodríguez is a 39 year old male , right -hand dominant, who presents for followup evaluation and management of a right hand injury. Returns today doing well, no pain. Light use of the hand at home. He was last seen by us 4/23/2025.    He injured his hand on 4/16/2025 while hitting a door out of frustration. Onset of pain, swelling. Presented to the ED on 4/19/2025, xrays showed a boxer's fracture. He was splinted.      It has been 4 weeks since the initial injury. Not much pain today.      Pain 0/10.    Previous treatment: splint    Past medical history:  has a past medical history of Calculus of kidney (12/04) and Headache(784.0).   Patient Active Problem List    Diagnosis Date Noted    Calculus of kidney 12/13/2004     Priority: Medium       Past surgical history:  has a past surgical history that includes surgical history of -  (1995) and surgical history of -  (2/02).     Medications:    Current Outpatient Medications   Medication Sig Dispense Refill    ibuprofen (ADVIL/MOTRIN) 200 MG tablet Take 200 mg by mouth every 4 hours as needed for pain.      LORazepam (ATIVAN) 0.5 MG tablet Take 0.5 mg by mouth every 6 hours as needed for anxiety. (Patient not taking: Reported on 4/23/2025)          Allergies:   No Known Allergies     Family History: family history includes Breast Cancer in his maternal grandmother; Cancer - colorectal in his maternal aunt.     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 His smoking use included cigarettes. He started smoking about 24 years ago. He has a 2.5 pack-year smoking history. He has never used smokeless  "tobacco. He reports current alcohol use of about 16.7 standard drinks of alcohol per week. He reports that he does not use drugs.    Review of Systems:     Denies numbness, tingling, parasthesias.   Denies headaches.   Denies fevers, chills, night sweats   Denies chest pain.   Denies shortness of breath.   Denies any skin problems, abrasions, rashes, irritation.      Physical Exam  GENERAL APPEARANCE: healthy, alert, no distress.   SKIN: no suspicious lesions or rashes  NEURO: Normal strength and tone, mentation intact and speech normal  PSYCH:  mentation appears normal and affect normal. Not anxious.  RESPIRATORY: No increased work of breathing.    Ht 1.702 m (5' 7\")   Wt 59 kg (130 lb)   BMI 20.36 kg/m       right HAND EXAM:    The splint was removed    Skin intact. No ecchymosis.   Degenerative changes of the IP joints.    There is minimal swelling in the hand, small finger.  There is no tenderness in the 5th metacarpal phalangeal joint, 5th metacarpal, small finger.  There is no ecchymosis.  There is no erythema of the surrounding skin.  There is no maceration of the skin.     No scissoring of fingers with fist attempt. No rotational abnormality of finger with fist.  Intact dip flexion against resistance with isolation of fdp, as well as intact fds with full pip and metacarpal phalangeal joint  flexion.   Viktor test shows intact central slip.  No bowstringing.  Intact extensors. No extensor lag.   Able to make a full fist.     Intact sensation to light touch in median, radial, ulnar nerves of the hand  Intact sensation to the radial and ulnar digital nerves of the finger, as well as the finger tip.  Brisk capillary refill to all fingers.   Palpable radial pulse, 2+.    X-rays:  3 views right hand, 3 views right small finger from 5/14/2025 were reviewed in clinic today. On my review, grossly stable alignment  Fifth metacarpal neck fracture with apex dorsal and ulnar angulation, interval callus formation. Joint " spaces are preserved. .    Assessment: 38yo RHD male with acute right hand pain status post punching a door, mildy angulated 5th metacarpal neck (boxer's) fracture..    Plan:  Nonop fracture management  Images reviewed, fracture healing well. Not 100% healed though, so need to take it easy another few weeks.  Buddy taping a few more weeks.  Work on finger range of motion.   Light weight bearing, progress per comfort over the next weeks.  Return to clinic as needed.      * All questions were addressed and answered prior to discharge from clinic today. The patient acknowledges an understanding of and agreement with the plan set forth during today's visit. Patient was advised to call our office or MyChart us if any further questions arise upon leaving our office today.        Jose Miguel Valentin M.D., M.S.  Dept. of Orthopaedic Surgery  [facility]

## 2025-05-14 ENCOUNTER — OFFICE VISIT (OUTPATIENT)
Dept: ORTHOPEDICS | Facility: CLINIC | Age: 40
End: 2025-05-14
Payer: COMMERCIAL

## 2025-05-14 ENCOUNTER — ANCILLARY PROCEDURE (OUTPATIENT)
Dept: GENERAL RADIOLOGY | Facility: CLINIC | Age: 40
End: 2025-05-14
Attending: ORTHOPAEDIC SURGERY
Payer: COMMERCIAL

## 2025-05-14 VITALS — WEIGHT: 130 LBS | BODY MASS INDEX: 20.4 KG/M2 | HEIGHT: 67 IN

## 2025-05-14 DIAGNOSIS — S62.316A DISPLACED FRACTURE OF BASE OF FIFTH METACARPAL BONE, RIGHT HAND, INITIAL ENCOUNTER FOR CLOSED FRACTURE: ICD-10-CM

## 2025-05-14 DIAGNOSIS — S62.316A DISPLACED FRACTURE OF BASE OF FIFTH METACARPAL BONE, RIGHT HAND, INITIAL ENCOUNTER FOR CLOSED FRACTURE: Primary | ICD-10-CM

## 2025-05-14 PROCEDURE — 73130 X-RAY EXAM OF HAND: CPT | Mod: TC | Performed by: RADIOLOGY

## 2025-05-14 ASSESSMENT — PAIN SCALES - GENERAL: PAINLEVEL_OUTOF10: NO PAIN (0)

## 2025-05-14 NOTE — LETTER
5/14/2025      Parish Rodríguez  [address] [facility] [id number]      Dear Colleague,    Thank you for referring your patient, Parish Rodríguez, to the [facility]. Please see a copy of my visit note below.    Chief Complaint:   Chief Complaint   Patient presents with     Right Hand - Follow Up     Parish is following up today for Right 5th MC neck fracture.  He is doing really well overall and not having any pain.   No concerns at this moments.          INJURY: right hand 5th metacarpal neck fracture  DATE of INJURY: 4/16/2025      HPI: Parish Rodríguez is a 39 year old male , right -hand dominant, who presents for followup evaluation and management of a right hand injury. Returns today doing well, no pain. Light use of the hand at home. He was last seen by us 4/23/2025.    He injured his hand on 4/16/2025 while hitting a door out of frustration. Onset of pain, swelling. Presented to the ED on 4/19/2025, xrays showed a boxer's fracture. He was splinted.      It has been 4 weeks since the initial injury. Not much pain today.      Pain 0/10.    Previous treatment: splint    Past medical history:  has a past medical history of Calculus of kidney (12/04) and Headache(784.0).   Patient Active Problem List    Diagnosis Date Noted     Calculus of kidney 12/13/2004     Priority: Medium       Past surgical history:  has a past surgical history that includes surgical history of -  (1995) and surgical history of -  (2/02).     Medications:    Current Outpatient Medications   Medication Sig Dispense Refill     ibuprofen (ADVIL/MOTRIN) 200 MG tablet Take 200 mg by mouth every 4 hours as needed for pain.       LORazepam (ATIVAN) 0.5 MG tablet Take 0.5 mg by mouth every 6 hours as needed for anxiety. (Patient not taking: Reported on 4/23/2025)          Allergies:   No Known Allergies     Family History: family history includes Breast Cancer in his maternal grandmother; Cancer -  "colorectal in his maternal aunt.     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 His smoking use included cigarettes. He started smoking about 24 years ago. He has a 2.5 pack-year smoking history. He has never used smokeless tobacco. He reports current alcohol use of about 16.7 standard drinks of alcohol per week. He reports that he does not use drugs.    Review of Systems:     Denies numbness, tingling, parasthesias.   Denies headaches.   Denies fevers, chills, night sweats   Denies chest pain.   Denies shortness of breath.   Denies any skin problems, abrasions, rashes, irritation.      Physical Exam  GENERAL APPEARANCE: healthy, alert, no distress.   SKIN: no suspicious lesions or rashes  NEURO: Normal strength and tone, mentation intact and speech normal  PSYCH:  mentation appears normal and affect normal. Not anxious.  RESPIRATORY: No increased work of breathing.    Ht 1.702 m (5' 7\")   Wt 59 kg (130 lb)   BMI 20.36 kg/m       right HAND EXAM:    The splint was removed    Skin intact. No ecchymosis.   Degenerative changes of the IP joints.    There is minimal swelling in the hand, small finger.  There is no tenderness in the 5th metacarpal phalangeal joint, 5th metacarpal, small finger.  There is no ecchymosis.  There is no erythema of the surrounding skin.  There is no maceration of the skin.     No scissoring of fingers with fist attempt. No rotational abnormality of finger with fist.  Intact dip flexion against resistance with isolation of fdp, as well as intact fds with full pip and metacarpal phalangeal joint  flexion.   Viktor test shows intact central slip.  No bowstringing.  Intact extensors. No extensor lag.   Able to make a full fist.     Intact sensation to light touch in median, radial, ulnar nerves of the hand  Intact sensation to the radial and ulnar digital nerves of the finger, as well as the finger tip.  Brisk capillary refill to all fingers.   Palpable " radial pulse, 2+.    X-rays:  3 views right hand, 3 views right small finger from 5/14/2025 were reviewed in clinic today. On my review, grossly stable alignment  Fifth metacarpal neck fracture with apex dorsal and ulnar angulation, interval callus formation. Joint spaces are preserved. .    Assessment: 38yo RHD male with acute right hand pain status post punching a door, mildy angulated 5th metacarpal neck (boxer's) fracture..    Plan:  Nonop fracture management  Images reviewed, fracture healing well. Not 100% healed though, so need to take it easy another few weeks.  Buddy taping a few more weeks.  Work on finger range of motion.   Light weight bearing, progress per comfort over the next weeks.  Return to clinic as needed.      * All questions were addressed and answered prior to discharge from clinic today. The patient acknowledges an understanding of and agreement with the plan set forth during today's visit. Patient was advised to call our office or MyChart us if any further questions arise upon leaving our office today.        Jose Miguel Valentin M.D., M.S.  Dept. of Orthopaedic Surgery  [facility]        Again, thank you for allowing me to participate in the care of your patient.        Sincerely,        Jose Miguel Valentin MD    Electronically signed

## 2025-05-14 NOTE — LETTER
May 14, 2025      Parish Rodríguez  [address] [facility] [address]        To Whom It May Concern:    Parish Rodríguez was seen in our clinic. He may return to work starting on 5.19.25 with no restrictions.       Sincerely,      Jose Miguel Valentin        Electronically signed